# Patient Record
Sex: FEMALE | Race: WHITE | Employment: OTHER | URBAN - METROPOLITAN AREA
[De-identification: names, ages, dates, MRNs, and addresses within clinical notes are randomized per-mention and may not be internally consistent; named-entity substitution may affect disease eponyms.]

---

## 2024-01-03 ENCOUNTER — OFFICE VISIT (OUTPATIENT)
Age: 74
End: 2024-01-03
Payer: MEDICARE

## 2024-01-03 VITALS
DIASTOLIC BLOOD PRESSURE: 55 MMHG | HEIGHT: 66 IN | SYSTOLIC BLOOD PRESSURE: 117 MMHG | RESPIRATION RATE: 16 BRPM | WEIGHT: 172 LBS | TEMPERATURE: 68 F | OXYGEN SATURATION: 96 % | BODY MASS INDEX: 27.64 KG/M2

## 2024-01-03 DIAGNOSIS — R41.0 CONFUSION: ICD-10-CM

## 2024-01-03 DIAGNOSIS — R41.3 MEMORY DIFFICULTY: Primary | ICD-10-CM

## 2024-01-03 PROCEDURE — G8419 CALC BMI OUT NRM PARAM NOF/U: HCPCS | Performed by: PSYCHIATRY & NEUROLOGY

## 2024-01-03 PROCEDURE — G8400 PT W/DXA NO RESULTS DOC: HCPCS | Performed by: PSYCHIATRY & NEUROLOGY

## 2024-01-03 PROCEDURE — 96138 PSYCL/NRPSYC TECH 1ST: CPT | Performed by: PSYCHIATRY & NEUROLOGY

## 2024-01-03 PROCEDURE — G8428 CUR MEDS NOT DOCUMENT: HCPCS | Performed by: PSYCHIATRY & NEUROLOGY

## 2024-01-03 PROCEDURE — 99204 OFFICE O/P NEW MOD 45 MIN: CPT | Performed by: PSYCHIATRY & NEUROLOGY

## 2024-01-03 PROCEDURE — G8484 FLU IMMUNIZE NO ADMIN: HCPCS | Performed by: PSYCHIATRY & NEUROLOGY

## 2024-01-03 PROCEDURE — 1090F PRES/ABSN URINE INCON ASSESS: CPT | Performed by: PSYCHIATRY & NEUROLOGY

## 2024-01-03 PROCEDURE — 96132 NRPSYC TST EVAL PHYS/QHP 1ST: CPT | Performed by: PSYCHIATRY & NEUROLOGY

## 2024-01-03 PROCEDURE — 3017F COLORECTAL CA SCREEN DOC REV: CPT | Performed by: PSYCHIATRY & NEUROLOGY

## 2024-01-03 PROCEDURE — 1123F ACP DISCUSS/DSCN MKR DOCD: CPT | Performed by: PSYCHIATRY & NEUROLOGY

## 2024-01-03 PROCEDURE — 4004F PT TOBACCO SCREEN RCVD TLK: CPT | Performed by: PSYCHIATRY & NEUROLOGY

## 2024-01-03 RX ORDER — THIAMINE MONONITRATE (VIT B1) 100 MG
100 TABLET ORAL DAILY
COMMUNITY

## 2024-01-03 RX ORDER — ERGOCALCIFEROL (VITAMIN D2) 10 MCG
2000 TABLET ORAL DAILY
COMMUNITY

## 2024-01-03 RX ORDER — LOPERAMIDE HYDROCHLORIDE 2 MG/1
CAPSULE ORAL
COMMUNITY
Start: 2023-10-09

## 2024-01-03 RX ORDER — LATANOPROST 50 UG/ML
1 SOLUTION/ DROPS OPHTHALMIC NIGHTLY
COMMUNITY

## 2024-01-03 RX ORDER — LEVOTHYROXINE SODIUM 0.1 MG/1
100 TABLET ORAL DAILY
COMMUNITY

## 2024-01-03 RX ORDER — LISINOPRIL AND HYDROCHLOROTHIAZIDE 12.5; 1 MG/1; MG/1
TABLET ORAL
COMMUNITY
Start: 2023-10-16

## 2024-01-03 RX ORDER — GLIPIZIDE 5 MG/1
TABLET, FILM COATED, EXTENDED RELEASE ORAL
COMMUNITY
Start: 2023-10-16

## 2024-01-03 RX ORDER — ATORVASTATIN CALCIUM 40 MG/1
40 TABLET, FILM COATED ORAL NIGHTLY
COMMUNITY

## 2024-01-03 RX ORDER — SEMAGLUTIDE 2.68 MG/ML
INJECTION, SOLUTION SUBCUTANEOUS
COMMUNITY
Start: 2023-12-07

## 2024-01-03 RX ORDER — ESCITALOPRAM OXALATE 10 MG/1
10 TABLET ORAL DAILY
COMMUNITY
Start: 2023-10-17

## 2024-01-03 NOTE — PROGRESS NOTES
[id number] (16 minute minimum)  16__ minutes were spent administering cognitive testing by medical assistant/nurse.   Cognitive Testing Evaluation     Introduction:     Arianna Armenta  1950  Female   This 73 year old Female was administered a battery of neurocognitive testing on 01/03/2024.     Tests Administered:     Trails A, Trails B, Digit Symbol Substitution, Stroop, Immediate Recognition, Delayed Recognition   The combined test administration time was 11 minutes   Test Results:   Cognitive testing was provided via a battery of cognitive assessments. The pattern of test scores indicate that results are valid.  A Clinical Report with further description of scores and results is also available.   Overall: Patient tested in the 23rd percentile (standard score of 89).   Trails A: Patient tested in the 60th percentile (scaled standard score of 104).  Trails B: Patient tested in the 58th percentile (scaled standard score of 103).  Digit Symbol Substitution: Patient tested in the 44th percentile (scaled standard score of 98).  Stroop: Patient tested in the 15th percentile (scaled standard score of 84).  Immediate Recognition: Patient tested in the 57th percentile (scaled standard score of 103).  Delayed Recognition: Patient tested in the 8th percentile (scaled standard score of 79).      Interpretation of Test Scores:     Examination of individual component tests shows:   Attention - Trails A: unlikely impairment  Mental Flexibility - Trails B: unlikely impairment  Executive Function - Digit Symbol Substitution: unlikely impairment  Executive Function - Stroop: possible impairment  Memory - Immediate Recognition: unlikely impairment  Memory - Delayed Recognition: possible impairment    The patient’s overall cognitive test performance was in the 23rd percentile when compared to individuals of a similar age and gender, suggesting possible presence of cognitive impairment.elbert  
with progressive memory difficulty/confusion and differential diagnosis certainly includes age-related cognitive decline versus mild cognitive impairment versus early dementing process versus processing issue versus attention versus emotional versus metabolic, structural versus vascular versus other  We will proceed with workup to include:  MRI of the brain  EEG  Carotid Doppler  Formal neurocognitive eval  We will send for her laboratory analysis to ensure B12 has been checked  Follow-up after her testing    Monisha Deras MD          This note was created using voice recognition software. Despite editing, there may be syntax errors.

## 2024-01-03 NOTE — PATIENT INSTRUCTIONS
Neurocognitive consult/testing procedure:    Please contact office after scheduling with one of the facilities listed below with whom you are seeing, date and time of appt and we will fax orders and notes tho that facility and schedule your follow up with our office after testing.        [facility] [place]  Dr. Yojana Nunez  [address] [address]  (P) [phone]        [facility]  Dr. Romaine Ba (provides counseling as well as cognitive evals)  [address]  [facility] [address]  (P) [phone]  (F) [phone]    [place] [place] [facility] Test Result Communication    Test results are available in [employer].  [employer] is the patient portal into our electronic health record.  This feature allows patients to see diagnostic test results, immunizations, allergies, past medical and surgical history, current medications, and send messages directly to providers.  Our team members at the  can provide additional information and assist with registration.  The [employer] support team can be reached at [phone].    In some cases, a provider might need time to explain the results in detail during a follow-up appointment.  This might include additional information or context that will help patients understand the reason for next steps in the plan of care recommended by their provider.    If a patient chooses to receive diagnostic testing at an imaging center outside of the [facility] network, it is the patient's responsibility to bring the imaging report and disc to their [facility] follow-up appointment.    If the test results reveal anything that is particularly noteworthy, we will contact you to discuss the matter and, if necessary, schedule a follow-up appointment at an earlier date.    If you have not received your test results by [employer] or other communication within 7 days, please

## 2024-01-11 ENCOUNTER — PROCEDURE VISIT (OUTPATIENT)
Age: 74
End: 2024-01-11

## 2024-01-11 DIAGNOSIS — R41.3 MEMORY DIFFICULTY: ICD-10-CM

## 2024-01-11 DIAGNOSIS — R41.0 CONFUSION: Primary | ICD-10-CM

## 2024-01-12 NOTE — PROCEDURES
[facility]   Electroencephalogram Report    Procedure ID: [id number] Procedure Date: 1/11/2024   Patient Name: Arianna Armenta YOB: 1950   Procedure Type: Routine Medical Record No: [id number]       An EEG is requested in this 73-year-old lady to evaluate for epileptiform abnormalities.  Her medications are said to include Lexapro, lisinopril, hydrochlorothiazide, Ozempic, metformin, Synthroid    This tracing is obtained during the awake and drowsy states.    During wakefulness there are intermittent runs of posteriorly dominant and symmetric low to medium amplitude 9 cps activities which attenuate with eye opening.  Lower voltage faster frequency activities are seen symmetrically over the anterior head regions.    Hyperventilation is not performed.    Intermittent photic stimulation little altered titration.    During drowsiness, the background rhythms attenuate and are replaced with diffuse symmetric theta range activities.  Later stages of sleep were not obtained.    Interpretation  This EEG recorded during the awake and drowsy states is normal.        Monisha Deras MD

## 2024-01-15 ENCOUNTER — TELEPHONE (OUTPATIENT)
Age: 74
End: 2024-01-15

## 2024-01-15 DIAGNOSIS — F40.240 CLAUSTROPHOBIA: Primary | ICD-10-CM

## 2024-01-15 RX ORDER — DIAZEPAM 10 MG/1
TABLET ORAL
Qty: 1 TABLET | Refills: 0 | Status: SHIPPED | OUTPATIENT
Start: 2024-01-15 | End: 2024-01-20

## 2024-01-15 NOTE — TELEPHONE ENCOUNTER
Patient is scheduled for MRI on 1/19/24 and requesting the doctor to call her in a medication to help with the process.

## 2024-01-19 ENCOUNTER — HOSPITAL ENCOUNTER (OUTPATIENT)
Facility: HOSPITAL | Age: 74
End: 2024-01-19
Attending: PSYCHIATRY & NEUROLOGY
Payer: MEDICARE

## 2024-01-19 DIAGNOSIS — R41.3 MEMORY DIFFICULTY: ICD-10-CM

## 2024-01-19 DIAGNOSIS — R41.0 CONFUSION: ICD-10-CM

## 2024-01-19 PROCEDURE — 70551 MRI BRAIN STEM W/O DYE: CPT

## 2024-03-06 ENCOUNTER — OFFICE VISIT (OUTPATIENT)
Age: 74
End: 2024-03-06
Payer: MEDICARE

## 2024-03-06 VITALS
SYSTOLIC BLOOD PRESSURE: 128 MMHG | RESPIRATION RATE: 16 BRPM | OXYGEN SATURATION: 95 % | DIASTOLIC BLOOD PRESSURE: 61 MMHG | HEART RATE: 94 BPM

## 2024-03-06 DIAGNOSIS — R41.3 MEMORY DIFFICULTY: Primary | ICD-10-CM

## 2024-03-06 PROCEDURE — 1123F ACP DISCUSS/DSCN MKR DOCD: CPT | Performed by: PSYCHIATRY & NEUROLOGY

## 2024-03-06 PROCEDURE — 1090F PRES/ABSN URINE INCON ASSESS: CPT | Performed by: PSYCHIATRY & NEUROLOGY

## 2024-03-06 PROCEDURE — 99213 OFFICE O/P EST LOW 20 MIN: CPT | Performed by: PSYCHIATRY & NEUROLOGY

## 2024-03-06 PROCEDURE — G8484 FLU IMMUNIZE NO ADMIN: HCPCS | Performed by: PSYCHIATRY & NEUROLOGY

## 2024-03-06 PROCEDURE — 3017F COLORECTAL CA SCREEN DOC REV: CPT | Performed by: PSYCHIATRY & NEUROLOGY

## 2024-03-06 PROCEDURE — 4004F PT TOBACCO SCREEN RCVD TLK: CPT | Performed by: PSYCHIATRY & NEUROLOGY

## 2024-03-06 PROCEDURE — G8400 PT W/DXA NO RESULTS DOC: HCPCS | Performed by: PSYCHIATRY & NEUROLOGY

## 2024-03-06 PROCEDURE — G8419 CALC BMI OUT NRM PARAM NOF/U: HCPCS | Performed by: PSYCHIATRY & NEUROLOGY

## 2024-03-06 PROCEDURE — G8427 DOCREV CUR MEDS BY ELIG CLIN: HCPCS | Performed by: PSYCHIATRY & NEUROLOGY

## 2024-03-06 ASSESSMENT — PATIENT HEALTH QUESTIONNAIRE - PHQ9
SUM OF ALL RESPONSES TO PHQ QUESTIONS 1-9: 0
1. LITTLE INTEREST OR PLEASURE IN DOING THINGS: 0
SUM OF ALL RESPONSES TO PHQ QUESTIONS 1-9: 0
SUM OF ALL RESPONSES TO PHQ QUESTIONS 1-9: 0
SUM OF ALL RESPONSES TO PHQ9 QUESTIONS 1 & 2: 0
2. FEELING DOWN, DEPRESSED OR HOPELESS: 0
SUM OF ALL RESPONSES TO PHQ QUESTIONS 1-9: 0

## 2024-03-06 NOTE — PROGRESS NOTES
[facility] at [facility] at [address] [place] [address] [address] [place] [address] [phone]              Chief Complaint   Patient presents with    Follow-up     Patient is still having memory difficulties     Results     Patient is here for results for MRI,EEG,DOP     Current Outpatient Medications   Medication Sig Dispense Refill    atorvastatin (LIPITOR) 40 MG tablet Take 1 tablet by mouth nightly      Vitamin D, Cholecalciferol, 10 MCG (400 UNIT) TABS Take 2,000 Units by mouth daily      escitalopram (LEXAPRO) 10 MG tablet Take 1 tablet by mouth daily      glipiZIDE (GLUCOTROL XL) 5 MG extended release tablet       insulin 70-30 (HUMULIN;NOVOLIN) (70-30) 100 UNIT per ML injection vial Inject 30 Units into the skin 2 times daily (before meals)      latanoprost (XALATAN) 0.005 % ophthalmic solution Place 1 drop into both eyes nightly      levothyroxine (SYNTHROID) 100 MCG tablet Take 1 tablet by mouth daily      lisinopril-hydroCHLOROthiazide (PRINZIDE;ZESTORETIC) 10-12.5 MG per tablet       loperamide (IMODIUM) 2 MG capsule TAKE 1 TABLET BY MOUTH EVERY 6-8 HOURS AS NEEDED FOR LOOSE STOOLS      metFORMIN (GLUCOPHAGE) 1000 MG tablet Take 1 tablet by mouth 2 times daily      OZEMPIC, 2 MG/DOSE, 8 MG/3ML SOPN INJECT 2MG SUBCUTANEOUSLY ONCE WEEKLY      vitamin B-1 (THIAMINE) 100 MG tablet Take 1 tablet by mouth daily       No current facility-administered medications for this visit.      Allergies   Allergen Reactions    Codeine Rash     Hives     Social History     Tobacco Use    Smoking status: Never    Smokeless tobacco: Never   Vaping Use    Vaping Use: Never used   Substance Use Topics    Alcohol use: Not Currently    Drug use: Not Currently     73-year-old lady following up for cognitive concerns.  She continues to have difficulty with memory.  Her daughter is

## 2024-05-15 ENCOUNTER — TELEPHONE (OUTPATIENT)
Age: 74
End: 2024-05-15

## 2024-05-16 ENCOUNTER — OFFICE VISIT (OUTPATIENT)
Age: 74
End: 2024-05-16
Payer: MEDICARE

## 2024-05-16 ENCOUNTER — PROCEDURE VISIT (OUTPATIENT)
Age: 74
End: 2024-05-16
Payer: MEDICARE

## 2024-05-16 DIAGNOSIS — G31.84 MILD COGNITIVE IMPAIRMENT: Primary | ICD-10-CM

## 2024-05-16 DIAGNOSIS — F43.10 PTSD (POST-TRAUMATIC STRESS DISORDER): ICD-10-CM

## 2024-05-16 DIAGNOSIS — F41.9 CHRONIC ANXIETY: ICD-10-CM

## 2024-05-16 DIAGNOSIS — F41.9 ANXIETY WITH SOMATIZATION: ICD-10-CM

## 2024-05-16 DIAGNOSIS — F43.21 ADJUSTMENT DISORDER WITH DEPRESSED MOOD: ICD-10-CM

## 2024-05-16 DIAGNOSIS — F45.0 ANXIETY WITH SOMATIZATION: ICD-10-CM

## 2024-05-16 PROCEDURE — 96138 PSYCL/NRPSYC TECH 1ST: CPT | Performed by: CLINICAL NEUROPSYCHOLOGIST

## 2024-05-16 PROCEDURE — 96132 NRPSYC TST EVAL PHYS/QHP 1ST: CPT | Performed by: CLINICAL NEUROPSYCHOLOGIST

## 2024-05-16 PROCEDURE — 90791 PSYCH DIAGNOSTIC EVALUATION: CPT | Performed by: CLINICAL NEUROPSYCHOLOGIST

## 2024-05-16 PROCEDURE — 4004F PT TOBACCO SCREEN RCVD TLK: CPT | Performed by: CLINICAL NEUROPSYCHOLOGIST

## 2024-05-16 PROCEDURE — 1123F ACP DISCUSS/DSCN MKR DOCD: CPT | Performed by: CLINICAL NEUROPSYCHOLOGIST

## 2024-05-16 PROCEDURE — 96139 PSYCL/NRPSYC TST TECH EA: CPT | Performed by: CLINICAL NEUROPSYCHOLOGIST

## 2024-05-16 PROCEDURE — 96133 NRPSYC TST EVAL PHYS/QHP EA: CPT | Performed by: CLINICAL NEUROPSYCHOLOGIST

## 2024-05-16 NOTE — PROGRESS NOTES
[facility]  [place] [facility]  NEUROLOGY CLINIC   [address]   [place] [address]   [phone] Office   [phone] Fax      Neuropsychology    Initial Diagnostic Interview Note      Referral:  Adenike Ramirez    Arianna Armenta is a 73 y.o. left handed   female who was unaccompanied to the initial clinical interview on 5/16/24.  Please refer to her medical records for details pertaining to her history.   At the start of the appointment, I reviewed the patient's [facility] Epic Chart (including Media scanned in from previous providers) for the active Problem List, all pertinent Past Medical Hx, medications, recent radiologic and laboratory findings.  In addition, I reviewed pt's documented Immunization Record and Encounter History.       1 year of college without history of previously diagnosed LD and/or receipt of special education services.  She is retired and worked as a [employer] Manager.  She has a strong family history of dementia. She has no known background stroke, meningitis/encephalitis, MEREDITH Fever, Lupus, Lyme, TBI, sz.  She has progressive memory decline. She and spouse are concerned and they are trying to figure out which of them will go to nursing home first.  Sleep apnea- on CPAP.  Appetite is is fine. Spouse has always done the finances.  She retains independence for driving (but gets lost-always has), medications, finances, day-to-day chores, ADLs.  She will catch herself from falling.  She worries all the time and forgets the name of her medication. She forgets name.  Medication for mood helpful.  No acute or focal issues.  Enjoys reading, working in yard, travelling. he notes that she is becoming increasingly forgetful over the last 1 to 2 years. Her mother and her paternal grandmother had dementia. She notes that she has always had some forgetfulness but it certainly been progressively worse. Her  says that

## 2024-05-20 NOTE — PROGRESS NOTES
[facility]  [place] [facility]  NEUROLOGY CLINIC   [address]   [place] [address]   [phone] Office   [phone] Fax      Neuropsychological evaluation Report    Referral:  Adenike Ramirez    Arianna Armenta is a 73 y.o. left handed   female who was unaccompanied to the initial clinical interview on 5/16/24.  Please refer to her medical records for details pertaining to her history.   At the start of the appointment, I reviewed the patient's [facility] Epic Chart (including Media scanned in from previous providers) for the active Problem List, all pertinent Past Medical Hx, medications, recent radiologic and laboratory findings.  In addition, I reviewed pt's documented Immunization Record and Encounter History.       1 year of college without history of previously diagnosed LD and/or receipt of special education services.  She is retired and worked as a [employer] Manager.  She has a strong family history of dementia. She has no known background stroke, meningitis/encephalitis, MEREDITH Fever, Lupus, Lyme, TBI, sz.  She has progressive memory decline. She and spouse are concerned and they are trying to figure out which of them will go to nursing home first.  Sleep apnea- on CPAP.  Appetite is is fine. Spouse has always done the finances.  She retains independence for driving (but gets lost-always has), medications, finances, day-to-day chores, ADLs.  She will catch herself from falling.  She worries all the time and forgets the name of her medication. She forgets name.  Medication for mood helpful.  No acute or focal issues.  Enjoys reading, working in yard, travelling. he notes that she is becoming increasingly forgetful over the last 1 to 2 years. Her mother and her paternal grandmother had dementia. She notes that she has always had some forgetfulness but it certainly been progressively worse. Her  says that she is loopy. She

## 2024-05-31 ENCOUNTER — OFFICE VISIT (OUTPATIENT)
Age: 74
End: 2024-05-31
Payer: MEDICARE

## 2024-05-31 DIAGNOSIS — F45.0 ANXIETY WITH SOMATIZATION: ICD-10-CM

## 2024-05-31 DIAGNOSIS — F41.9 ANXIETY WITH SOMATIZATION: ICD-10-CM

## 2024-05-31 DIAGNOSIS — G31.84 MILD COGNITIVE IMPAIRMENT: Primary | ICD-10-CM

## 2024-05-31 DIAGNOSIS — F43.10 PTSD (POST-TRAUMATIC STRESS DISORDER): ICD-10-CM

## 2024-05-31 PROCEDURE — 96132 NRPSYC TST EVAL PHYS/QHP 1ST: CPT | Performed by: CLINICAL NEUROPSYCHOLOGIST

## 2024-05-31 NOTE — PROGRESS NOTES
Patient presents to ER with c/o RUQ abdominal pain under her ribs x few weeks. Patient states she was seen by PMD today and had US and labs but does not know the results. Patient states pain is worse with movement or standing. Denies injury. Patient also c/o nausea, urinary frequency and diarrhea. Patient denies fevers, or vomiting.   A follow up Neuropsychological Evaluation is indicated on a prn basis, especially if there are any cognitive and/or emotional changes.       DIAGNOSES:             The Referral Dx of [facility]- IS NOT SUPPORTED                                      PTSD                                      Anxiety With Somatic Features                                      Adjustment Disorder with Depressed Mood organic  1.We reviewed the findings from the evaluation including test results, diagnosis, and suspected contributing factors  2.Reviewed recommendations outlined in report  3.Answered questions to the best of my ability     The patient needs to:   1.Follow-up with referring provider for ongoing management  2.Follow up with treatment recommendations as outlined   3.Use practical compensatory methods to compensate for cognitive changes  4.Emphasize modifiable protective behaviors for cognitive functioning such as exercise, diet, and cognitive stimulation     Patient's response to recommendations: Patient voiced understanding    Historian: Good  Praxis: No UE apraxia  R/L Orientation: Intact to self and to other  Dress: within normal limits   Weight: within normal limits   Appearance/Hygiene: within normal limits   Gait: within normal limits   Assistive Devices: None  Mood: within normal limits   Affect: within normal limits   Comprehension: within normal limits   Thought Process: within normal limits   Expressive Language: within normal limits   Receptive Language: within normal limits   Motor:  No cognitive or motor perseveration  ETOH: Denied  Tobacco: Denied  Illicit: Denied  SI/HI: Denied  Psychosis: Denied  Insight: Within normal limits  Judgment: Within normal limits  Other Psych:                  The patient had the following concerns which I deferred to their referring provider:   1.Medications for cognition     TIME SPENT PROVIDING SERVICES:  40 minutes     BILLING:  96132 x 1 Units     *Code 07391: Neuropsychological